# Patient Record
Sex: FEMALE | Race: WHITE | NOT HISPANIC OR LATINO | URBAN - METROPOLITAN AREA
[De-identification: names, ages, dates, MRNs, and addresses within clinical notes are randomized per-mention and may not be internally consistent; named-entity substitution may affect disease eponyms.]

---

## 2017-06-26 ENCOUNTER — INPATIENT (INPATIENT)
Facility: HOSPITAL | Age: 57
LOS: 1 days | Discharge: HOME | End: 2017-06-28
Attending: INTERNAL MEDICINE | Admitting: INTERNAL MEDICINE
Payer: COMMERCIAL

## 2017-06-26 VITALS — HEIGHT: 63 IN | WEIGHT: 179.9 LBS

## 2017-06-26 DIAGNOSIS — L03.012 CELLULITIS OF LEFT FINGER: ICD-10-CM

## 2017-06-26 DIAGNOSIS — I89.1 LYMPHANGITIS: ICD-10-CM

## 2017-06-26 LAB
ALBUMIN SERPL ELPH-MCNC: 3.6 G/DL — SIGNIFICANT CHANGE UP (ref 3.3–5)
ALP SERPL-CCNC: 121 U/L — HIGH (ref 40–120)
ALT FLD-CCNC: 30 U/L — SIGNIFICANT CHANGE UP (ref 12–78)
ANION GAP SERPL CALC-SCNC: 9 MMOL/L — SIGNIFICANT CHANGE UP (ref 5–17)
AST SERPL-CCNC: 28 U/L — SIGNIFICANT CHANGE UP (ref 15–37)
BASOPHILS # BLD AUTO: 0.1 K/UL — SIGNIFICANT CHANGE UP (ref 0–0.2)
BASOPHILS NFR BLD AUTO: 0.9 % — SIGNIFICANT CHANGE UP (ref 0–2)
BILIRUB SERPL-MCNC: 0.2 MG/DL — SIGNIFICANT CHANGE UP (ref 0.2–1.2)
BUN SERPL-MCNC: 20 MG/DL — SIGNIFICANT CHANGE UP (ref 7–23)
CALCIUM SERPL-MCNC: 8.9 MG/DL — SIGNIFICANT CHANGE UP (ref 8.5–10.1)
CHLORIDE SERPL-SCNC: 108 MMOL/L — SIGNIFICANT CHANGE UP (ref 96–108)
CO2 SERPL-SCNC: 25 MMOL/L — SIGNIFICANT CHANGE UP (ref 22–31)
CREAT SERPL-MCNC: 0.83 MG/DL — SIGNIFICANT CHANGE UP (ref 0.5–1.3)
EOSINOPHIL # BLD AUTO: 0.3 K/UL — SIGNIFICANT CHANGE UP (ref 0–0.5)
EOSINOPHIL NFR BLD AUTO: 3.7 % — SIGNIFICANT CHANGE UP (ref 0–6)
GLUCOSE SERPL-MCNC: 114 MG/DL — HIGH (ref 70–99)
HCT VFR BLD CALC: 40.4 % — SIGNIFICANT CHANGE UP (ref 34.5–45)
HGB BLD-MCNC: 13.5 G/DL — SIGNIFICANT CHANGE UP (ref 11.5–15.5)
LYMPHOCYTES # BLD AUTO: 2.1 K/UL — SIGNIFICANT CHANGE UP (ref 1–3.3)
LYMPHOCYTES # BLD AUTO: 22.6 % — SIGNIFICANT CHANGE UP (ref 13–44)
MCHC RBC-ENTMCNC: 27.5 PG — SIGNIFICANT CHANGE UP (ref 27–34)
MCHC RBC-ENTMCNC: 33.5 GM/DL — SIGNIFICANT CHANGE UP (ref 32–36)
MCV RBC AUTO: 82.1 FL — SIGNIFICANT CHANGE UP (ref 80–100)
MONOCYTES # BLD AUTO: 0.6 K/UL — SIGNIFICANT CHANGE UP (ref 0–0.9)
MONOCYTES NFR BLD AUTO: 6.4 % — SIGNIFICANT CHANGE UP (ref 2–14)
NEUTROPHILS # BLD AUTO: 6.2 K/UL — SIGNIFICANT CHANGE UP (ref 1.8–7.4)
NEUTROPHILS NFR BLD AUTO: 66.4 % — SIGNIFICANT CHANGE UP (ref 43–77)
PLATELET # BLD AUTO: 266 K/UL — SIGNIFICANT CHANGE UP (ref 150–400)
POTASSIUM SERPL-MCNC: 4.3 MMOL/L — SIGNIFICANT CHANGE UP (ref 3.5–5.3)
POTASSIUM SERPL-SCNC: 4.3 MMOL/L — SIGNIFICANT CHANGE UP (ref 3.5–5.3)
PROT SERPL-MCNC: 7.4 GM/DL — SIGNIFICANT CHANGE UP (ref 6–8.3)
RBC # BLD: 4.92 M/UL — SIGNIFICANT CHANGE UP (ref 3.8–5.2)
RBC # FLD: 13.1 % — SIGNIFICANT CHANGE UP (ref 10.3–14.5)
SODIUM SERPL-SCNC: 142 MMOL/L — SIGNIFICANT CHANGE UP (ref 135–145)
WBC # BLD: 9.4 K/UL — SIGNIFICANT CHANGE UP (ref 3.8–10.5)
WBC # FLD AUTO: 9.4 K/UL — SIGNIFICANT CHANGE UP (ref 3.8–10.5)

## 2017-06-26 PROCEDURE — 99285 EMERGENCY DEPT VISIT HI MDM: CPT

## 2017-06-26 PROCEDURE — 71020: CPT | Mod: 26

## 2017-06-26 PROCEDURE — 93010 ELECTROCARDIOGRAM REPORT: CPT

## 2017-06-26 PROCEDURE — 73140 X-RAY EXAM OF FINGER(S): CPT | Mod: 26,LT

## 2017-06-26 RX ORDER — ACETAMINOPHEN 500 MG
1000 TABLET ORAL ONCE
Qty: 0 | Refills: 0 | Status: DISCONTINUED | OUTPATIENT
Start: 2017-06-26 | End: 2017-06-28

## 2017-06-26 RX ORDER — AMPICILLIN SODIUM AND SULBACTAM SODIUM 250; 125 MG/ML; MG/ML
3 INJECTION, POWDER, FOR SUSPENSION INTRAMUSCULAR; INTRAVENOUS ONCE
Qty: 0 | Refills: 0 | Status: COMPLETED | OUTPATIENT
Start: 2017-06-26 | End: 2017-06-26

## 2017-06-26 RX ORDER — DOCUSATE SODIUM 100 MG
100 CAPSULE ORAL THREE TIMES A DAY
Qty: 0 | Refills: 0 | Status: DISCONTINUED | OUTPATIENT
Start: 2017-06-26 | End: 2017-06-28

## 2017-06-26 RX ORDER — VANCOMYCIN HCL 1 G
1000 VIAL (EA) INTRAVENOUS ONCE
Qty: 0 | Refills: 0 | Status: COMPLETED | OUTPATIENT
Start: 2017-06-26 | End: 2017-06-26

## 2017-06-26 RX ORDER — VANCOMYCIN HCL 1 G
1000 VIAL (EA) INTRAVENOUS EVERY 12 HOURS
Qty: 0 | Refills: 0 | Status: DISCONTINUED | OUTPATIENT
Start: 2017-06-26 | End: 2017-06-28

## 2017-06-26 RX ORDER — AMPICILLIN SODIUM AND SULBACTAM SODIUM 250; 125 MG/ML; MG/ML
3 INJECTION, POWDER, FOR SUSPENSION INTRAMUSCULAR; INTRAVENOUS EVERY 6 HOURS
Qty: 0 | Refills: 0 | Status: DISCONTINUED | OUTPATIENT
Start: 2017-06-26 | End: 2017-06-28

## 2017-06-26 RX ORDER — ACETAMINOPHEN 500 MG
650 TABLET ORAL EVERY 6 HOURS
Qty: 0 | Refills: 0 | Status: DISCONTINUED | OUTPATIENT
Start: 2017-06-26 | End: 2017-06-28

## 2017-06-26 RX ORDER — KETOROLAC TROMETHAMINE 30 MG/ML
30 SYRINGE (ML) INJECTION EVERY 6 HOURS
Qty: 0 | Refills: 0 | Status: DISCONTINUED | OUTPATIENT
Start: 2017-06-26 | End: 2017-06-28

## 2017-06-26 RX ORDER — SENNA PLUS 8.6 MG/1
2 TABLET ORAL AT BEDTIME
Qty: 0 | Refills: 0 | Status: DISCONTINUED | OUTPATIENT
Start: 2017-06-26 | End: 2017-06-28

## 2017-06-26 RX ORDER — SODIUM CHLORIDE 9 MG/ML
1000 INJECTION INTRAMUSCULAR; INTRAVENOUS; SUBCUTANEOUS ONCE
Qty: 0 | Refills: 0 | Status: COMPLETED | OUTPATIENT
Start: 2017-06-26 | End: 2017-06-26

## 2017-06-26 RX ORDER — MUPIROCIN 20 MG/G
1 OINTMENT TOPICAL
Qty: 0 | Refills: 0 | Status: DISCONTINUED | OUTPATIENT
Start: 2017-06-26 | End: 2017-06-28

## 2017-06-26 RX ADMIN — AMPICILLIN SODIUM AND SULBACTAM SODIUM 200 GRAM(S): 250; 125 INJECTION, POWDER, FOR SUSPENSION INTRAMUSCULAR; INTRAVENOUS at 20:07

## 2017-06-26 RX ADMIN — Medication 1 TABLET(S): at 17:40

## 2017-06-26 RX ADMIN — Medication 650 MILLIGRAM(S): at 17:35

## 2017-06-26 RX ADMIN — Medication 30 MILLIGRAM(S): at 17:48

## 2017-06-26 RX ADMIN — SODIUM CHLORIDE 1000 MILLILITER(S): 9 INJECTION INTRAMUSCULAR; INTRAVENOUS; SUBCUTANEOUS at 11:41

## 2017-06-26 RX ADMIN — Medication 30 MILLIGRAM(S): at 18:02

## 2017-06-26 RX ADMIN — Medication 250 MILLIGRAM(S): at 15:27

## 2017-06-26 RX ADMIN — Medication 650 MILLIGRAM(S): at 16:57

## 2017-06-26 RX ADMIN — AMPICILLIN SODIUM AND SULBACTAM SODIUM 200 GRAM(S): 250; 125 INJECTION, POWDER, FOR SUSPENSION INTRAMUSCULAR; INTRAVENOUS at 11:41

## 2017-06-26 NOTE — H&P ADULT - HISTORY OF PRESENT ILLNESS
66 year old female with history of breast cancer s/p bilateral mastectomy 3 yr ago followed by multiple surgeries for reconstruction and breast implants presents with c/o left index finger pain, swelling, warmth and redness spreading from left index finger to left elbow for 5 days graudally worsening.  Pt noticed left index finger medial swelling and pain first after noticed a small abrasion which looked like a 'paper cut'.  She had manicure 4 days before lesion started.  She went to [place] after [place].  She denies fever, chills, malaise, night sweats, loss of function of left arm/hand, headache.

## 2017-06-26 NOTE — H&P ADULT - ASSESSMENT
66 year old female with history of breast cancer s/p bilateral mastectomy 3 yr ago followed by multiple surgeries for reconstruction and breast implants presents with c/o left index finger pain, swelling, warmth and redness spreading from left index finger to left elbow for 5 days graudally worsening.  Pt noticed left index finger medial swelling and pain first after noticed a small abrasion which looked like a 'paper cut'.

## 2017-06-26 NOTE — PROGRESS NOTE ADULT - SUBJECTIVE AND OBJECTIVE BOX
See dictated consult.      56 F with 3 d hx worsening L index finger erythema, edema, ternderness, ascending lymphangiitis.    Bedside I&D performed.  Small amount of pus drained.      A/P: Begin BID warm soaks with saline + betadine tomorrow  Remove packing at first dressing change  Cover with bactroban and dry dressing  F/u as outpt  D/c as per primary team

## 2017-06-26 NOTE — ED STATDOCS - MUSCULOSKELETAL, MLM
Left hand with erythema and swelling to radial side of index finger. Erythema on dorsal side of hand ending approximately 2cm proximal to wrist.

## 2017-06-26 NOTE — ED STATDOCS - ATTENDING CONTRIBUTION TO CARE
I, Chris Roper, performed the initial face to face bedside interview with this patient regarding history of present illness, review of symptoms and relevant past medical, social and family history.  I completed an independent physical examination.  I was the initial provider who evaluated this patient. I have signed out the follow up of any pending tests (i.e. labs, radiological studies) to the ACP.  I have communicated the patient’s plan of care and disposition with the ACP.  The history, relevant review of systems, past medical and surgical history, medical decision making, and physical examination was documented by the scribe in my presence and I attest to the accuracy of the documentation.

## 2017-06-26 NOTE — ED STATDOCS - OBJECTIVE STATEMENT
55 y/o female with PMHx of breast CA, s/p mastectomy presents to the ED c/o swelling and pain to 2nd digit on left hand with redness spreading from finger to wrist over the last few days. Denies any tingling, numbness. Pt had paper cut to the finger recently. Pt does not bite her nails. Recent travel to [place] and came back 6 days ago.

## 2017-06-26 NOTE — H&P ADULT - NSHPPHYSICALEXAM_GEN_ALL_CORE
PHYSICAL EXAM:      Constitutional: NAD, well-groomed, well-developed  HEENT: PERRLA, EOMI, Normal Hearing  Neck: No LAD, No JVD  Back: Normal spine flexure, No CVA tenderness  Respiratory: CTABL  Cardiovascular: S1 and S2, RRR, no M/G/R  Gastrointestinal: BS+, soft, NT/ND  Extremities: No peripheral edema  Vascular: 2+ peripheral pulses  Neurological: A/O x 3, no focal deficits  Psychiatric: Normal mood, normal affect  Musculoskeletal: 5/5 strength b/l upper and lower extremities  Skin: left index finger erythema, swelling, tenderness, streaking upto left elbow vental aspect

## 2017-06-26 NOTE — ED ADULT TRIAGE NOTE - CHIEF COMPLAINT QUOTE
left hand first digit infection x 5 days. pt had small cut at tip of finger and now present with red tracking marking going up forearm, pt denies fevers.

## 2017-06-26 NOTE — ED STATDOCS - PROGRESS NOTE DETAILS
signed Esther Duffy PA-C Pt seen initially in intake by Dr Roper.   pt here for left index finger infection, ascending lymphangitis just proximal to AC fossa. no adenothpathy. denies fever or chills. no hx trauma, pt notes she saw a "papercut" then traveled to [place] and [place]. Right hand dominant. SPoke to Dr Barnes for hand. recommends admit to medicine. Pt agrees with admission and plan of care.

## 2017-06-26 NOTE — ED ADULT NURSE NOTE - OBJECTIVE STATEMENT
Pt presents with swelling and reddness to L finger and hand. Pt thought it was a paper cut but got worse within the last week. Red streak noted to L arm. NS infusing per orders and antibiotic infusing per orders. PA at bedside.

## 2017-06-26 NOTE — PROVIDER CONTACT NOTE (OTHER) - SITUATION
Dr. Barnes's office called. Spoke to  Zaida, who said that Dr. Barnes was on call and she would give him the message to see the patient

## 2017-06-26 NOTE — H&P ADULT - PROBLEM SELECTOR PLAN 1
..  - Admit to floor  - IV vanco and unasyn  - Hand surgery consult   - Toradol and tylenol for analgesia  - Encourage ambulation and keep affected limb elevated ..  - Admit to floor  - IV vanco and unasyn  - Hand surgery consult   - Toradol and tylenol for analgesia  - Encourage ambulation and keep affected limb elevated  - PO diet

## 2017-06-27 LAB
ANION GAP SERPL CALC-SCNC: 8 MMOL/L — SIGNIFICANT CHANGE UP (ref 5–17)
BUN SERPL-MCNC: 21 MG/DL — SIGNIFICANT CHANGE UP (ref 7–23)
CALCIUM SERPL-MCNC: 8.4 MG/DL — LOW (ref 8.5–10.1)
CHLORIDE SERPL-SCNC: 109 MMOL/L — HIGH (ref 96–108)
CO2 SERPL-SCNC: 26 MMOL/L — SIGNIFICANT CHANGE UP (ref 22–31)
CREAT SERPL-MCNC: 0.66 MG/DL — SIGNIFICANT CHANGE UP (ref 0.5–1.3)
GLUCOSE SERPL-MCNC: 90 MG/DL — SIGNIFICANT CHANGE UP (ref 70–99)
HCT VFR BLD CALC: 38.2 % — SIGNIFICANT CHANGE UP (ref 34.5–45)
HGB BLD-MCNC: 12.5 G/DL — SIGNIFICANT CHANGE UP (ref 11.5–15.5)
MCHC RBC-ENTMCNC: 27.2 PG — SIGNIFICANT CHANGE UP (ref 27–34)
MCHC RBC-ENTMCNC: 32.7 GM/DL — SIGNIFICANT CHANGE UP (ref 32–36)
MCV RBC AUTO: 83.3 FL — SIGNIFICANT CHANGE UP (ref 80–100)
PLATELET # BLD AUTO: 231 K/UL — SIGNIFICANT CHANGE UP (ref 150–400)
POTASSIUM SERPL-MCNC: 4.2 MMOL/L — SIGNIFICANT CHANGE UP (ref 3.5–5.3)
POTASSIUM SERPL-SCNC: 4.2 MMOL/L — SIGNIFICANT CHANGE UP (ref 3.5–5.3)
RBC # BLD: 4.59 M/UL — SIGNIFICANT CHANGE UP (ref 3.8–5.2)
RBC # FLD: 12.9 % — SIGNIFICANT CHANGE UP (ref 10.3–14.5)
SODIUM SERPL-SCNC: 143 MMOL/L — SIGNIFICANT CHANGE UP (ref 135–145)
WBC # BLD: 7.1 K/UL — SIGNIFICANT CHANGE UP (ref 3.8–10.5)
WBC # FLD AUTO: 7.1 K/UL — SIGNIFICANT CHANGE UP (ref 3.8–10.5)

## 2017-06-27 RX ADMIN — Medication 1 TABLET(S): at 11:24

## 2017-06-27 RX ADMIN — Medication 1 MILLIGRAM(S): at 00:17

## 2017-06-27 RX ADMIN — AMPICILLIN SODIUM AND SULBACTAM SODIUM 200 GRAM(S): 250; 125 INJECTION, POWDER, FOR SUSPENSION INTRAMUSCULAR; INTRAVENOUS at 02:30

## 2017-06-27 RX ADMIN — Medication 30 MILLIGRAM(S): at 05:56

## 2017-06-27 RX ADMIN — Medication 250 MILLIGRAM(S): at 18:22

## 2017-06-27 RX ADMIN — MUPIROCIN 1 APPLICATION(S): 20 OINTMENT TOPICAL at 21:32

## 2017-06-27 RX ADMIN — AMPICILLIN SODIUM AND SULBACTAM SODIUM 200 GRAM(S): 250; 125 INJECTION, POWDER, FOR SUSPENSION INTRAMUSCULAR; INTRAVENOUS at 07:56

## 2017-06-27 RX ADMIN — Medication 100 MILLIGRAM(S): at 05:49

## 2017-06-27 RX ADMIN — Medication 250 MILLIGRAM(S): at 05:49

## 2017-06-27 RX ADMIN — AMPICILLIN SODIUM AND SULBACTAM SODIUM 200 GRAM(S): 250; 125 INJECTION, POWDER, FOR SUSPENSION INTRAMUSCULAR; INTRAVENOUS at 11:23

## 2017-06-27 RX ADMIN — Medication 30 MILLIGRAM(S): at 06:10

## 2017-06-27 RX ADMIN — MUPIROCIN 1 APPLICATION(S): 20 OINTMENT TOPICAL at 11:24

## 2017-06-27 RX ADMIN — AMPICILLIN SODIUM AND SULBACTAM SODIUM 200 GRAM(S): 250; 125 INJECTION, POWDER, FOR SUSPENSION INTRAMUSCULAR; INTRAVENOUS at 23:08

## 2017-06-27 RX ADMIN — AMPICILLIN SODIUM AND SULBACTAM SODIUM 200 GRAM(S): 250; 125 INJECTION, POWDER, FOR SUSPENSION INTRAMUSCULAR; INTRAVENOUS at 17:51

## 2017-06-27 RX ADMIN — Medication 100 MILLIGRAM(S): at 21:27

## 2017-06-27 NOTE — PROGRESS NOTE ADULT - SUBJECTIVE AND OBJECTIVE BOX
CHIEF COMPLAINT:    SUBJECTIVE:     REVIEW OF SYSTEMS:    CONSTITUTIONAL: No weakness, fevers or chills  EYES/ENT: No visual changes;  No vertigo or throat pain   NECK: No pain or stiffness  RESPIRATORY: No cough, wheezing, hemoptysis; No shortness of breath  CARDIOVASCULAR: No chest pain or palpitations  GASTROINTESTINAL: No abdominal or epigastric pain. No nausea, vomiting, or hematemesis; No diarrhea or constipation. No melena or hematochezia.  GENITOURINARY: No dysuria, frequency or hematuria  NEUROLOGICAL: No numbness or weakness  SKIN: No itching, burning, rashes, or lesions   All other review of systems is negative unless indicated above    Vital Signs Last 24 Hrs  T(C): 36.3 (27 Jun 2017 04:45), Max: 36.6 (26 Jun 2017 10:43)  T(F): 97.3 (27 Jun 2017 04:45), Max: 97.9 (26 Jun 2017 10:43)  HR: 92 (26 Jun 2017 15:11) (71 - 92)  BP: 134/79 (26 Jun 2017 15:11) (132/73 - 134/79)  BP(mean): --  RR: 16 (27 Jun 2017 04:45) (15 - 18)  SpO2: 98% (27 Jun 2017 04:45) (97% - 100%)    I&O's Summary    26 Jun 2017 07:01  -  27 Jun 2017 07:00  --------------------------------------------------------  IN: 550 mL / OUT: 0 mL / NET: 550 mL        CAPILLARY BLOOD GLUCOSE          PHYSICAL EXAM:    Constitutional: NAD, awake and alert, well-developed  HEENT: PERR, EOMI, Normal Hearing, MMM  Neck: Soft and supple, No LAD, No JVD  Respiratory: Breath sounds are clear bilaterally, No wheezing, rales or rhonchi  Cardiovascular: S1 and S2, regular rate and rhythm, no Murmurs, gallops or rubs  Gastrointestinal: Bowel Sounds present, soft, nontender, nondistended, no guarding, no rebound  Extremities: No peripheral edema  Vascular: 2+ peripheral pulses  Neurological: A/O x 3, no focal deficits  Musculoskeletal: 5/5 strength b/l upper and lower extremities  Skin: No rashes    MEDICATIONS:  MEDICATIONS  (STANDING):  acetaminophen  IVPB. 1000 milliGRAM(s) IV Intermittent once  docusate sodium 100 milliGRAM(s) Oral three times a day  multivitamin 1 Tablet(s) Oral daily  vancomycin  IVPB 1000 milliGRAM(s) IV Intermittent every 12 hours  ampicillin/sulbactam  IVPB 3 Gram(s) IV Intermittent every 6 hours  mupirocin 2% Ointment 1 Application(s) Topical two times a day      LABS: All Labs Reviewed:                        12.5   7.1   )-----------( 231      ( 27 Jun 2017 07:35 )             38.2     06-27    143  |  109<H>  |  21  ----------------------------<  90  4.2   |  26  |  0.66    Ca    8.4<L>      27 Jun 2017 07:35    TPro  7.4  /  Alb  3.6  /  TBili  0.2  /  DBili  x   /  AST  28  /  ALT  30  /  AlkPhos  121<H>  06-26            66 year old female with history of breast cancer s/p bilateral mastectomy 3 yr ago followed by multiple surgeries for reconstruction and breast implants presents with c/o left index finger pain, swelling, warmth and redness spreading from left index finger to left elbow for 5 days graudally worsening.  Pt noticed left index finger medial swelling and pain first after noticed a small abrasion which looked like a 'paper cut'.    Problem/Plan - 1:  ·  Problem: Cellulitis of finger, left.  Plan: ..  - Admit to floor  - IV vanco and unasyn  - Hand surgery consult   - Toradol and tylenol for analgesia  - Encourage ambulation and keep affected limb elevated  - PO diet.     Problem/Plan - 2:  ·  Problem: Ascending lymphangitis.  Plan: ..   - As above. CHIEF COMPLAINT/Diagnosis: cellulitis of the finger    SUBJECTIVE: no complaints    REVIEW OF SYSTEMS:    CONSTITUTIONAL: No weakness, fevers or chills  EYES/ENT: No visual changes;  No vertigo or throat pain   NECK: No pain or stiffness  RESPIRATORY: No cough, wheezing, hemoptysis; No shortness of breath  CARDIOVASCULAR: No chest pain or palpitations  GASTROINTESTINAL: No abdominal or epigastric pain. No nausea, vomiting, or hematemesis; No diarrhea or constipation. No melena or hematochezia.  GENITOURINARY: No dysuria, frequency or hematuria  NEUROLOGICAL: No numbness or weakness  SKIN: No itching, burning, rashes, or lesions   All other review of systems is negative unless indicated above    Vital Signs Last 24 Hrs  T(C): 36.3 (27 Jun 2017 04:45), Max: 36.6 (26 Jun 2017 10:43)  T(F): 97.3 (27 Jun 2017 04:45), Max: 97.9 (26 Jun 2017 10:43)  HR: 92 (26 Jun 2017 15:11) (71 - 92)  BP: 134/79 (26 Jun 2017 15:11) (132/73 - 134/79)  BP(mean): --  RR: 16 (27 Jun 2017 04:45) (15 - 18)  SpO2: 98% (27 Jun 2017 04:45) (97% - 100%)    I&O's Summary    26 Jun 2017 07:01  -  27 Jun 2017 07:00  --------------------------------------------------------  IN: 550 mL / OUT: 0 mL / NET: 550 mL        CAPILLARY BLOOD GLUCOSE          PHYSICAL EXAM:    Constitutional: NAD, awake and alert, well-developed  HEENT: PERR, EOMI, Normal Hearing, MMM  Neck: Soft and supple, No LAD, No JVD  Respiratory: Breath sounds are clear bilaterally, No wheezing, rales or rhonchi  Cardiovascular: S1 and S2, regular rate and rhythm, no Murmurs, gallops or rubs  Gastrointestinal: Bowel Sounds present, soft, nontender, nondistended, no guarding, no rebound  Extremities: No peripheral edema  Vascular: 2+ peripheral pulses  Neurological: A/O x 3, no focal deficits  Musculoskeletal: 5/5 strength b/l upper and lower extremities  Skin: erthema on the left index finger w/ extension in tracking distrubution to the upper arm    MEDICATIONS:  MEDICATIONS  (STANDING):  acetaminophen  IVPB. 1000 milliGRAM(s) IV Intermittent once  docusate sodium 100 milliGRAM(s) Oral three times a day  multivitamin 1 Tablet(s) Oral daily  vancomycin  IVPB 1000 milliGRAM(s) IV Intermittent every 12 hours  ampicillin/sulbactam  IVPB 3 Gram(s) IV Intermittent every 6 hours  mupirocin 2% Ointment 1 Application(s) Topical two times a day      LABS: All Labs Reviewed:                        12.5   7.1   )-----------( 231      ( 27 Jun 2017 07:35 )             38.2     06-27    143  |  109<H>  |  21  ----------------------------<  90  4.2   |  26  |  0.66    Ca    8.4<L>      27 Jun 2017 07:35    TPro  7.4  /  Alb  3.6  /  TBili  0.2  /  DBili  x   /  AST  28  /  ALT  30  /  AlkPhos  121<H>  06-26            66 year old female with history of breast cancer s/p total  bilateral mastectomy 3 yr ago followed by multiple surgeries for reconstruction and breast implants presents with c/o left index finger pain, swelling, warmth and redness spreading from left index finger to left elbow for 5 days graudally worsening.  Pt noticed left index finger medial swelling and pain first after noticed a small abrasion which looked like a 'paper cut'.    1-Cellulitis of finger, left and Lymphangitis  -s/p I+D and drainage of the lesion; wound looks clean, no drainage. Erythema persists in a lymph drainage distrubution from the hand to the inner upper arm  - IV vanco and unasyn- tolerating well  - Toradol and percocet for pain control  - Encourage ambulation and keep affected limb elevated  - PO diet.     2-DVT proph- patient is ambulatory

## 2017-06-27 NOTE — DIETITIAN INITIAL EVALUATION ADULT. - FACTORS AFF FOOD INTAKE
difficulty feeding self/Patient admitted with left finger cellulitis, has difficulty cutting foods as her finger is wrapped up

## 2017-06-27 NOTE — DIETITIAN INITIAL EVALUATION ADULT. - NS FNS REASON FOR WEIGHT CHANG
patient states she gained 30# after breast cancer, has lost 105# successfully after bariatric surgery

## 2017-06-27 NOTE — CONSULT NOTE ADULT - ASSESSMENT
66 year old female with history of breast cancer s/p bilateral mastectomy 3 yr ago followed by multiple surgeries for reconstruction and breast implants admitted 6/27 with c/p L index finger pain for about 5 days, has been experiencing L index finger pain, swelling, warmth, redness from L index finger tracking up to her L elbow. A few days ago, pt had a small paper cut which appeared as a small abrasion, she had a manicure about 4 days prior to the paper cut. She recently traveled to [place] and was swimming in [facility] [place] [facility] from 6/20-6/24 but lesions started after this. No fever, no chills, here afebrile, normal wbc ct, was given IV vancomycin/unasyn, had incision and drainage per surgery.     1. L index finger cellulitis/abscess s/p I &D with ascending lymphangitis    - agree with IV vancomycin [id number], check trough prior to 4th dose #2    - continue with unasyn [id number] for strep/staph/anaroebic coverage #2    - f/u cultures    - supportive care    - monitor temps    - -tolerating abx well so far; no side effects noted    -reason for abx use and side effects reviewed with patient; monitor BMP and vancomycin trough levels    - f/u cbc    - surgical f/u

## 2017-06-27 NOTE — CONSULT NOTE ADULT - SUBJECTIVE AND OBJECTIVE BOX
Patient is a 56y old  Female who presents with a chief complaint of Left index finger pain, erythema, warmth and redness spreading to elbow (26 Jun 2017 15:34)      HPI:  66 year old female with history of breast cancer s/p bilateral mastectomy 3 yr ago followed by multiple surgeries for reconstruction and breast implants admitted 6/27 with c/p L index finger pain for about 5 days, has been experiencing L index finger pain, swelling, warmth, redness from L index finger tracking up to her L elbow. A few days ago, pt had a small paper cut which appeared as a small abrasion, she had a manicure about 4 days prior to the paper cut. She recently traveled to [place] and was swimming in [facility] [place] [facility] from 6/20-6/24 but lesions started after this. No fever, no chills, here afebrile, normal wbc ct, was given IV vancomycin/unasyn, had incision and drainage per surgery.      PMH: as above    PSH: as above    Meds: per reconciliation sheet, noted below    MEDICATIONS  (STANDING):  acetaminophen  IVPB. 1000 milliGRAM(s) IV Intermittent once  docusate sodium 100 milliGRAM(s) Oral three times a day  multivitamin 1 Tablet(s) Oral daily  vancomycin  IVPB 1000 milliGRAM(s) IV Intermittent every 12 hours  ampicillin/sulbactam  IVPB 3 Gram(s) IV Intermittent every 6 hours  mupirocin 2% Ointment 1 Application(s) Topical two times a day      Allergies    No Known Allergies    Intolerances        Social: no smoking, no alcohol, no illegal drugs; no recent travel, no exposure to TB    Family history: NC      ROS: the patient denies fever, no chills, no HA, no dizziness, no sore throat, no blurry vision, no CP, no palpitations, no SOB, no cough, no abdominal pain, no diarrhea, no N/V, no dysuria, no leg pain, no claudication,  no rectal pain or bleeding, no night sweats    Vital Signs Last 24 Hrs  T(C): 36.5 (27 Jun 2017 12:03), Max: 36.5 (27 Jun 2017 12:03)  T(F): 97.7 (27 Jun 2017 12:03), Max: 97.7 (27 Jun 2017 12:03)  HR: 52 (27 Jun 2017 12:03) (52 - 92)  BP: 132/66 (27 Jun 2017 12:03) (132/66 - 134/79)  BP(mean): --  RR: 17 (27 Jun 2017 12:03) (15 - 17)  SpO2: 97% (27 Jun 2017 12:03) (97% - 98%)      PE:  Constitutional: NAD  HEENT: NC/AT, EOMI, PERRLA  Neck: supple  Back: no tenderness  Respiratory: clear  Cardiovascular: S1S2 regular, no murmurs  Abdomen: soft, not tender, not distended, positive BS  Genitourinary: deferred  Rectal: deferred  Musculoskeletal: no muscle tenderness, no joint swelling or tenderness  Extremities: L index finger s/p I & D with erythema, edema, tracking erythema up arm c/w lymphangitic spread  Neurological: AxOx3, moving all extremities, no focal deficits  Skin: no rashes    Labs:                        12.5   7.1   )-----------( 231      ( 27 Jun 2017 07:35 )             38.2     06-27    143  |  109<H>  |  21  ----------------------------<  90  4.2   |  26  |  0.66    Ca    8.4<L>      27 Jun 2017 07:35    TPro  7.4  /  Alb  3.6  /  TBili  0.2  /  DBili  x   /  AST  28  /  ALT  30  /  AlkPhos  121<H>  06-26     LIVER FUNCTIONS - ( 26 Jun 2017 11:40 )  Alb: 3.6 g/dL / Pro: 7.4 gm/dL / ALK PHOS: 121 U/L / ALT: 30 U/L / AST: 28 U/L / GGT: x                   Radiology: < from: Xray Chest 2 Views PA/Lat (06.26.17 @ 12:52) >    EXAM:  CHEST P.A. LATERAL                            PROCEDURE DATE:  06/26/2017        INTERPRETATION:  Exam Date: 6/26/2017 12:52 PM    Chest radiographs        CLINICAL INFORMATION:  ascending lymphangitis    COMPARISON: January 17, 2011    TECHNIQUE:  Frontal and lateral views of the chest were obtained.    FINDINGS/  IMPRESSION:      Surgical clips within the bilateral anterior chest wall. Surgical clips   in the left breast region. The lungs are clear.  No pleural abnormality   is seen.      The heart and mediastinum appear intact.         < end of copied text >      Advanced directives addressed: full resuscitation

## 2017-06-27 NOTE — DIETITIAN INITIAL EVALUATION ADULT. - OTHER INFO
66 year old female with history of breast cancer s/p bilateral mastectomy 3 yr ago followed by multiple surgeries for reconstruction and breast implants presents with c/o left index finger pain, swelling, warmth and redness spreading from left index finger to left elbow for 5 days gradually worsening. Patient reports [place] sx in 2003, with successful 105# weight loss- Patient states 3 years ago she was dx with breast cancer and gained 30#. Patient has been actively trying to lose weight- follows an Atkins diet with high protein. 66 year old female with history of breast cancer s/p bilateral mastectomy 3 yr ago followed by multiple surgeries for reconstruction and breast implants presents with c/o left index finger pain, swelling, warmth and redness spreading from left index finger to left elbow for 5 days gradually worsening.

## 2017-06-28 VITALS
RESPIRATION RATE: 14 BRPM | HEART RATE: 64 BPM | TEMPERATURE: 97 F | OXYGEN SATURATION: 99 % | DIASTOLIC BLOOD PRESSURE: 83 MMHG | SYSTOLIC BLOOD PRESSURE: 156 MMHG

## 2017-06-28 LAB — VANCOMYCIN TROUGH SERPL-MCNC: 10.7 UG/ML — SIGNIFICANT CHANGE UP (ref 10–20)

## 2017-06-28 RX ADMIN — Medication 250 MILLIGRAM(S): at 05:23

## 2017-06-28 RX ADMIN — MUPIROCIN 1 APPLICATION(S): 20 OINTMENT TOPICAL at 12:05

## 2017-06-28 RX ADMIN — AMPICILLIN SODIUM AND SULBACTAM SODIUM 200 GRAM(S): 250; 125 INJECTION, POWDER, FOR SUSPENSION INTRAMUSCULAR; INTRAVENOUS at 05:23

## 2017-06-28 RX ADMIN — Medication 100 MILLIGRAM(S): at 05:22

## 2017-06-28 RX ADMIN — Medication 30 MILLIGRAM(S): at 00:45

## 2017-06-28 RX ADMIN — Medication 30 MILLIGRAM(S): at 01:00

## 2017-06-28 RX ADMIN — AMPICILLIN SODIUM AND SULBACTAM SODIUM 200 GRAM(S): 250; 125 INJECTION, POWDER, FOR SUSPENSION INTRAMUSCULAR; INTRAVENOUS at 12:04

## 2017-06-28 RX ADMIN — Medication 1 TABLET(S): at 12:04

## 2017-06-28 NOTE — PROGRESS NOTE ADULT - SUBJECTIVE AND OBJECTIVE BOX
CHIEF COMPLAINT/Diagnosis: finger cellulitis    SUBJECTIVE:     REVIEW OF SYSTEMS:    CONSTITUTIONAL: No weakness, fevers or chills  EYES/ENT: No visual changes;  No vertigo or throat pain   NECK: No pain or stiffness  RESPIRATORY: No cough, wheezing, hemoptysis; No shortness of breath  CARDIOVASCULAR: No chest pain or palpitations  GASTROINTESTINAL: No abdominal or epigastric pain. No nausea, vomiting, or hematemesis; No diarrhea or constipation. No melena or hematochezia.  GENITOURINARY: No dysuria, frequency or hematuria  NEUROLOGICAL: No numbness or weakness  SKIN: No itching, burning, rashes, or lesions   All other review of systems is negative unless indicated above    Vital Signs Last 24 Hrs  T(C): 36.4 (28 Jun 2017 05:08), Max: 36.5 (27 Jun 2017 12:03)  T(F): 97.5 (28 Jun 2017 05:08), Max: 97.7 (27 Jun 2017 12:03)  HR: 59 (28 Jun 2017 05:08) (52 - 59)  BP: 141/67 (28 Jun 2017 05:08) (132/66 - 141/67)  BP(mean): --  RR: 16 (28 Jun 2017 05:08) (16 - 17)  SpO2: 98% (28 Jun 2017 05:08) (97% - 98%)    I&O's Summary    27 Jun 2017 07:01  -  28 Jun 2017 07:00  --------------------------------------------------------  IN: 450 mL / OUT: 0 mL / NET: 450 mL        CAPILLARY BLOOD GLUCOSE          PHYSICAL EXAM:    Constitutional: NAD, awake and alert, well-developed  HEENT: PERR, EOMI, Normal Hearing, MMM  Neck: Soft and supple, No LAD, No JVD  Respiratory: Breath sounds are clear bilaterally, No wheezing, rales or rhonchi  Cardiovascular: S1 and S2, regular rate and rhythm, no Murmurs, gallops or rubs  Gastrointestinal: Bowel Sounds present, soft, nontender, nondistended, no guarding, no rebound  Extremities: No peripheral edema  Vascular: 2+ peripheral pulses  Neurological: A/O x 3, no focal deficits  Musculoskeletal: 5/5 strength b/l upper and lower extremities  Skin: No rashes    MEDICATIONS:  MEDICATIONS  (STANDING):  acetaminophen  IVPB. 1000 milliGRAM(s) IV Intermittent once  docusate sodium 100 milliGRAM(s) Oral three times a day  multivitamin 1 Tablet(s) Oral daily  vancomycin  IVPB 1000 milliGRAM(s) IV Intermittent every 12 hours  ampicillin/sulbactam  IVPB 3 Gram(s) IV Intermittent every 6 hours  mupirocin 2% Ointment 1 Application(s) Topical two times a day      LABS: All Labs Reviewed:                        12.5   7.1   )-----------( 231      ( 27 Jun 2017 07:35 )             38.2     06-27    143  |  109<H>  |  21  ----------------------------<  90  4.2   |  26  |  0.66    Ca    8.4<L>      27 Jun 2017 07:35    TPro  7.4  /  Alb  3.6  /  TBili  0.2  /  DBili  x   /  AST  28  /  ALT  30  /  AlkPhos  121<H>  06-26          Assessment and plan:    66 year old female with history of breast cancer s/p total  bilateral mastectomy 3 yr ago followed by multiple surgeries for reconstruction and breast implants presents with c/o left index finger pain, swelling, warmth and redness spreading from left index finger to left elbow for 5 days graudally worsening.  Pt noticed left index finger medial swelling and pain first after noticed a small abrasion which looked like a 'paper cut'.    1-Cellulitis of finger, left and Lymphangitis  -s/p I+D and drainage of the lesion; wound looks clean, no drainage. Erythema persists in a lymph drainage distrubution from the hand to the inner upper arm  - IV vanco and unasyn- tolerating well  - Toradol and percocet for pain control  - Encourage ambulation and keep affected limb elevated  - PO diet.     2-DVT proph- patient is ambulatory

## 2017-06-28 NOTE — DISCHARGE NOTE ADULT - CARE PLAN
Principal Discharge DX:	Cellulitis of finger, left  Goal:	c/w Augment and Doxycycline for 7 days more then stop  Instructions for follow-up, activity and diet:	c/w Augment and Doxycycline for 7 days more then stop

## 2017-06-28 NOTE — DISCHARGE NOTE ADULT - MEDICATION SUMMARY - MEDICATIONS TO TAKE
I will START or STAY ON the medications listed below when I get home from the hospital:    Adoxa 100 mg oral tablet  -- 1 tab(s) by mouth 2 times a day  -- Avoid prolonged or excessive exposure to direct and/or artificial sunlight while taking this medication.  Do not take this drug if you are pregnant.  Finish all this medication unless otherwise directed by prescriber.  Medication should be taken with plenty of water.    -- Indication: For Cellulitis    amoxicillin-clavulanate 875 mg-125 mg oral tablet  -- 1 tab(s) by mouth 2 times a day  -- Finish all this medication unless otherwise directed by prescriber.  Take with food or milk.    -- Indication: For Cellulitis    multivitamin  -- 1 tab(s) by mouth once a day  -- Indication: For prophylaxis

## 2017-06-28 NOTE — PROGRESS NOTE ADULT - ASSESSMENT
66 year old female with history of breast cancer s/p bilateral mastectomy 3 yr ago followed by multiple surgeries for reconstruction and breast implants admitted 6/27 with c/p L index finger pain for about 5 days, has been experiencing L index finger pain, swelling, warmth, redness from L index finger tracking up to her L elbow. A few days ago, pt had a small paper cut which appeared as a small abrasion, she had a manicure about 4 days prior to the paper cut. She recently traveled to [place] and was swimming in [facility] from 6/20-6/24 but lesions started after this. No fever, no chills, here afebrile, normal wbc ct, was given IV vancomycin/unasyn, had incision and drainage per surgery.     1. L index finger cellulitis/abscess s/p I &D with ascending lymphangitis    - improving    -  on IV vancomycin [id number], trough therapeutic, #3    - on IV unasyn 3gmq6h for strep/staph/anaroebic coverage #3    - switch to oral doxycycline 100mg BID and augmentin 875/125mg BID for 7 days    - supportive care    - monitor temps    - -tolerating abx well so far; no side effects noted    -reason for abx use and side effects reviewed with patient; monitor BMP and vancomycin trough levels

## 2017-06-28 NOTE — PROGRESS NOTE ADULT - SUBJECTIVE AND OBJECTIVE BOX
66 year old female with history of breast cancer s/p bilateral mastectomy 3 yr ago followed by multiple surgeries for reconstruction and breast implants admitted 6/27 with c/p L index finger pain for about 5 days, has been experiencing L index finger pain, swelling, warmth, redness from L index finger tracking up to her L elbow. A few days ago, pt had a small paper cut which appeared as a small abrasion, she had a manicure about 4 days prior to the paper cut. She recently traveled to [place] and was swimming in [facility] from 6/20-6/24 but lesions started after this. No fever, no chills, here afebrile, normal wbc ct, was given IV vancomycin/unasyn, had incision and drainage per surgery.        finger with less redness, swelling  resolved erythema/tracking up arm        MEDICATIONS  (STANDING):  acetaminophen  IVPB. 1000 milliGRAM(s) IV Intermittent once  docusate sodium 100 milliGRAM(s) Oral three times a day  multivitamin 1 Tablet(s) Oral daily  vancomycin  IVPB 1000 milliGRAM(s) IV Intermittent every 12 hours  ampicillin/sulbactam  IVPB 3 Gram(s) IV Intermittent every 6 hours  mupirocin 2% Ointment 1 Application(s) Topical two times a day      Vital Signs Last 24 Hrs  T(C): 36.2 (28 Jun 2017 10:38), Max: 36.5 (27 Jun 2017 12:03)  T(F): 97.2 (28 Jun 2017 10:38), Max: 97.7 (27 Jun 2017 12:03)  HR: 64 (28 Jun 2017 10:38) (52 - 64)  BP: 156/83 (28 Jun 2017 10:38) (132/66 - 156/83)  BP(mean): --  RR: 14 (28 Jun 2017 10:38) (14 - 17)  SpO2: 99% (28 Jun 2017 10:38) (97% - 99%)          PE:  Constitutional: NAD  HEENT: NC/AT, EOMI, PERRLA  Neck: supple  Back: no tenderness  Respiratory: clear  Cardiovascular: S1S2 regular, no murmurs  Abdomen: soft, not tender, not distended, positive BS  Genitourinary: deferred  Rectal: deferred  Musculoskeletal: no muscle tenderness, no joint swelling or tenderness  Extremities: L index finger s/p I & D with decreased erythema, edema  Neurological: AxOx3, moving all extremities, no focal deficits  Skin: see above    Labs:                        12.5   7.1   )-----------( 231      ( 27 Jun 2017 07:35 )             38.2     06-27    143  |  109<H>  |  21  ----------------------------<  90  4.2   |  26  |  0.66    Ca    8.4<L>      27 Jun 2017 07:35         Vancomycin Level, Trough: 10.7 ug/mL (06-28 @ 05:01)              Radiology: < from: Xray Chest 2 Views PA/Lat (06.26.17 @ 12:52) >    EXAM:  CHEST P.A. LATERAL                            PROCEDURE DATE:  06/26/2017        INTERPRETATION:  Exam Date: 6/26/2017 12:52 PM    Chest radiographs        CLINICAL INFORMATION:  ascending lymphangitis    COMPARISON: January 17, 2011    TECHNIQUE:  Frontal and lateral views of the chest were obtained.    FINDINGS/  IMPRESSION:      Surgical clips within the bilateral anterior chest wall. Surgical clips   in the left breast region. The lungs are clear.  No pleural abnormality   is seen.      The heart and mediastinum appear intact.         < end of copied text >      Advanced directives addressed: full resuscitation

## 2017-06-28 NOTE — DISCHARGE NOTE ADULT - PATIENT PORTAL LINK FT
“You can access the [facility] Patient Portal, offered by [facility], by registering with the following website: [email or web address]”

## 2017-07-02 DIAGNOSIS — Z85.3 PERSONAL HISTORY OF MALIGNANT NEOPLASM OF BREAST: ICD-10-CM

## 2017-07-02 DIAGNOSIS — L02.512 CUTANEOUS ABSCESS OF LEFT HAND: ICD-10-CM

## 2017-07-02 DIAGNOSIS — L03.012 CELLULITIS OF LEFT FINGER: ICD-10-CM

## 2017-08-02 ENCOUNTER — OUTPATIENT (OUTPATIENT)
Dept: OUTPATIENT SERVICES | Facility: HOSPITAL | Age: 57
LOS: 1 days | End: 2017-08-02
Payer: COMMERCIAL

## 2017-08-02 PROCEDURE — 85025 COMPLETE CBC W/AUTO DIFF WBC: CPT

## 2017-08-02 PROCEDURE — 93010 ELECTROCARDIOGRAM REPORT: CPT | Mod: NC

## 2017-08-02 PROCEDURE — 80048 BASIC METABOLIC PNL TOTAL CA: CPT

## 2017-08-02 PROCEDURE — G0463: CPT

## 2017-08-02 PROCEDURE — 36415 COLL VENOUS BLD VENIPUNCTURE: CPT

## 2017-08-02 PROCEDURE — 93005 ELECTROCARDIOGRAM TRACING: CPT

## 2017-08-16 ENCOUNTER — OUTPATIENT (OUTPATIENT)
Dept: OUTPATIENT SERVICES | Facility: HOSPITAL | Age: 57
LOS: 1 days | Discharge: HOME | End: 2017-08-16
Payer: COMMERCIAL

## 2017-08-16 ENCOUNTER — TRANSCRIPTION ENCOUNTER (OUTPATIENT)
Age: 57
End: 2017-08-16

## 2017-08-16 PROCEDURE — 19350 NIPPLE/AREOLA RECONSTRUCTION: CPT | Mod: 50

## 2017-08-16 PROCEDURE — 19380 REVJ RECONSTRUCTED BREAST: CPT | Mod: 50

## 2017-08-16 PROCEDURE — 20926: CPT

## 2018-02-17 ENCOUNTER — TRANSCRIPTION ENCOUNTER (OUTPATIENT)
Age: 58
End: 2018-02-17

## 2018-11-16 ENCOUNTER — TRANSCRIPTION ENCOUNTER (OUTPATIENT)
Age: 58
End: 2018-11-16

## 2019-05-30 PROBLEM — C50.919 MALIGNANT NEOPLASM OF UNSPECIFIED SITE OF UNSPECIFIED FEMALE BREAST: Chronic | Status: ACTIVE | Noted: 2017-06-27

## 2019-06-04 ENCOUNTER — OUTPATIENT (OUTPATIENT)
Dept: OUTPATIENT SERVICES | Facility: HOSPITAL | Age: 59
LOS: 1 days | End: 2019-06-04
Payer: MEDICAID

## 2019-06-04 VITALS
DIASTOLIC BLOOD PRESSURE: 87 MMHG | OXYGEN SATURATION: 98 % | TEMPERATURE: 98 F | HEART RATE: 68 BPM | WEIGHT: 169.32 LBS | SYSTOLIC BLOOD PRESSURE: 137 MMHG | HEIGHT: 62 IN | RESPIRATION RATE: 16 BRPM

## 2019-06-04 DIAGNOSIS — Z98.890 OTHER SPECIFIED POSTPROCEDURAL STATES: Chronic | ICD-10-CM

## 2019-06-04 DIAGNOSIS — Z90.89 ACQUIRED ABSENCE OF OTHER ORGANS: Chronic | ICD-10-CM

## 2019-06-04 DIAGNOSIS — N65.0 DEFORMITY OF RECONSTRUCTED BREAST: ICD-10-CM

## 2019-06-04 DIAGNOSIS — Z98.84 BARIATRIC SURGERY STATUS: Chronic | ICD-10-CM

## 2019-06-04 DIAGNOSIS — Z01.818 ENCOUNTER FOR OTHER PREPROCEDURAL EXAMINATION: ICD-10-CM

## 2019-06-04 DIAGNOSIS — Z90.13 ACQUIRED ABSENCE OF BILATERAL BREASTS AND NIPPLES: Chronic | ICD-10-CM

## 2019-06-04 DIAGNOSIS — Z90.13 ACQUIRED ABSENCE OF BILATERAL BREASTS AND NIPPLES: ICD-10-CM

## 2019-06-04 DIAGNOSIS — Z85.3 PERSONAL HISTORY OF MALIGNANT NEOPLASM OF BREAST: ICD-10-CM

## 2019-06-04 LAB
ANION GAP SERPL CALC-SCNC: 11 MMOL/L — SIGNIFICANT CHANGE UP (ref 5–17)
BUN SERPL-MCNC: 18 MG/DL — SIGNIFICANT CHANGE UP (ref 7–23)
CALCIUM SERPL-MCNC: 9.9 MG/DL — SIGNIFICANT CHANGE UP (ref 8.4–10.5)
CHLORIDE SERPL-SCNC: 104 MMOL/L — SIGNIFICANT CHANGE UP (ref 96–108)
CO2 SERPL-SCNC: 28 MMOL/L — SIGNIFICANT CHANGE UP (ref 22–31)
CREAT SERPL-MCNC: 0.74 MG/DL — SIGNIFICANT CHANGE UP (ref 0.5–1.3)
GLUCOSE SERPL-MCNC: 95 MG/DL — SIGNIFICANT CHANGE UP (ref 70–99)
HCT VFR BLD CALC: 45.2 % — HIGH (ref 34.5–45)
HGB BLD-MCNC: 14.5 G/DL — SIGNIFICANT CHANGE UP (ref 11.5–15.5)
MCHC RBC-ENTMCNC: 28.4 PG — SIGNIFICANT CHANGE UP (ref 27–34)
MCHC RBC-ENTMCNC: 32.1 GM/DL — SIGNIFICANT CHANGE UP (ref 32–36)
MCV RBC AUTO: 88.6 FL — SIGNIFICANT CHANGE UP (ref 80–100)
NRBC # BLD: 0 /100 WBCS — SIGNIFICANT CHANGE UP (ref 0–0)
PLATELET # BLD AUTO: 262 K/UL — SIGNIFICANT CHANGE UP (ref 150–400)
POTASSIUM SERPL-MCNC: 4.9 MMOL/L — SIGNIFICANT CHANGE UP (ref 3.5–5.3)
POTASSIUM SERPL-SCNC: 4.9 MMOL/L — SIGNIFICANT CHANGE UP (ref 3.5–5.3)
RBC # BLD: 5.1 M/UL — SIGNIFICANT CHANGE UP (ref 3.8–5.2)
RBC # FLD: 13 % — SIGNIFICANT CHANGE UP (ref 10.3–14.5)
SODIUM SERPL-SCNC: 143 MMOL/L — SIGNIFICANT CHANGE UP (ref 135–145)
WBC # BLD: 8.11 K/UL — SIGNIFICANT CHANGE UP (ref 3.8–10.5)
WBC # FLD AUTO: 8.11 K/UL — SIGNIFICANT CHANGE UP (ref 3.8–10.5)

## 2019-06-04 PROCEDURE — 93010 ELECTROCARDIOGRAM REPORT: CPT | Mod: NC

## 2019-06-04 PROCEDURE — 80048 BASIC METABOLIC PNL TOTAL CA: CPT

## 2019-06-04 PROCEDURE — 85027 COMPLETE CBC AUTOMATED: CPT

## 2019-06-04 PROCEDURE — 93005 ELECTROCARDIOGRAM TRACING: CPT

## 2019-06-04 PROCEDURE — G0463: CPT

## 2019-06-04 PROCEDURE — 36415 COLL VENOUS BLD VENIPUNCTURE: CPT

## 2019-06-04 RX ORDER — SODIUM CHLORIDE 9 MG/ML
1000 INJECTION, SOLUTION INTRAVENOUS
Refills: 0 | Status: DISCONTINUED | OUTPATIENT
Start: 2019-06-18 | End: 2019-06-18

## 2019-06-04 NOTE — H&P PST ADULT - NSANTHOSAYNRD_GEN_A_CORE
No. CASSIDY screening performed.  STOP BANG Legend: 0-2 = LOW Risk; 3-4 = INTERMEDIATE Risk; 5-8 = HIGH Risk

## 2019-06-04 NOTE — H&P PST ADULT - NSICDXPROBLEM_GEN_ALL_CORE_FT
PROBLEM DIAGNOSES  Problem: Breast reconstruction deformity  Assessment and Plan: Patient is schedule for bilateral breast reconstruction revision.

## 2019-06-04 NOTE — H&P PST ADULT - RS GEN PE MLT RESP DETAILS PC
respirations non-labored/good air movement/breath sounds equal/clear to auscultation bilaterally/normal/airway patent/no chest wall tenderness

## 2019-06-04 NOTE — H&P PST ADULT - HISTORY OF PRESENT ILLNESS
57 y/o female presents for PST. As per patient she was diagnosed with right breast cancer in 2014 and had bilateral mastectomy done with reconstruction. Patient is now returning for revision of bilateral breast reconstruction.

## 2019-06-04 NOTE — H&P PST ADULT - NSICDXPASTSURGICALHX_GEN_ALL_CORE_FT
PAST SURGICAL HISTORY:  H/O abdominoplasty 2005    H/O bilateral mastectomy 2014    H/O excision of dermoid cyst 2000    History of facelift 2018    S/P breast reconstruction, bilateral 2014 and 2018    S/P gastric bypass 2003    S/P tonsillectomy

## 2019-06-17 ENCOUNTER — TRANSCRIPTION ENCOUNTER (OUTPATIENT)
Age: 59
End: 2019-06-17

## 2019-06-18 ENCOUNTER — RESULT REVIEW (OUTPATIENT)
Age: 59
End: 2019-06-18

## 2019-06-18 ENCOUNTER — OUTPATIENT (OUTPATIENT)
Dept: OUTPATIENT SERVICES | Facility: HOSPITAL | Age: 59
LOS: 1 days | End: 2019-06-18
Payer: MEDICAID

## 2019-06-18 VITALS
OXYGEN SATURATION: 95 % | DIASTOLIC BLOOD PRESSURE: 81 MMHG | RESPIRATION RATE: 15 BRPM | TEMPERATURE: 98 F | SYSTOLIC BLOOD PRESSURE: 119 MMHG

## 2019-06-18 VITALS
OXYGEN SATURATION: 100 % | WEIGHT: 169.32 LBS | HEART RATE: 57 BPM | RESPIRATION RATE: 18 BRPM | DIASTOLIC BLOOD PRESSURE: 76 MMHG | TEMPERATURE: 98 F | HEIGHT: 62 IN | SYSTOLIC BLOOD PRESSURE: 124 MMHG

## 2019-06-18 DIAGNOSIS — Z98.890 OTHER SPECIFIED POSTPROCEDURAL STATES: Chronic | ICD-10-CM

## 2019-06-18 DIAGNOSIS — Z85.3 PERSONAL HISTORY OF MALIGNANT NEOPLASM OF BREAST: ICD-10-CM

## 2019-06-18 DIAGNOSIS — Z90.13 ACQUIRED ABSENCE OF BILATERAL BREASTS AND NIPPLES: Chronic | ICD-10-CM

## 2019-06-18 DIAGNOSIS — N65.0 DEFORMITY OF RECONSTRUCTED BREAST: ICD-10-CM

## 2019-06-18 DIAGNOSIS — Z98.84 BARIATRIC SURGERY STATUS: Chronic | ICD-10-CM

## 2019-06-18 DIAGNOSIS — Z90.3 ACQUIRED ABSENCE OF STOMACH [PART OF]: ICD-10-CM

## 2019-06-18 DIAGNOSIS — Z90.89 ACQUIRED ABSENCE OF OTHER ORGANS: Chronic | ICD-10-CM

## 2019-06-18 PROCEDURE — 19380 REVJ RECONSTRUCTED BREAST: CPT | Mod: 50

## 2019-06-18 PROCEDURE — 88304 TISSUE EXAM BY PATHOLOGIST: CPT

## 2019-06-18 PROCEDURE — 88304 TISSUE EXAM BY PATHOLOGIST: CPT | Mod: 26

## 2019-06-18 RX ORDER — ONDANSETRON 8 MG/1
4 TABLET, FILM COATED ORAL ONCE
Refills: 0 | Status: DISCONTINUED | OUTPATIENT
Start: 2019-06-18 | End: 2019-06-18

## 2019-06-18 RX ORDER — SODIUM CHLORIDE 9 MG/ML
1000 INJECTION, SOLUTION INTRAVENOUS
Refills: 0 | Status: DISCONTINUED | OUTPATIENT
Start: 2019-06-18 | End: 2019-06-18

## 2019-06-18 RX ORDER — ASCORBIC ACID 60 MG
1 TABLET,CHEWABLE ORAL
Qty: 0 | Refills: 0 | DISCHARGE

## 2019-06-18 RX ORDER — HYDROMORPHONE HYDROCHLORIDE 2 MG/ML
0.5 INJECTION INTRAMUSCULAR; INTRAVENOUS; SUBCUTANEOUS
Refills: 0 | Status: DISCONTINUED | OUTPATIENT
Start: 2019-06-18 | End: 2019-06-18

## 2019-06-18 RX ORDER — OXYCODONE HYDROCHLORIDE 5 MG/1
5 TABLET ORAL ONCE
Refills: 0 | Status: DISCONTINUED | OUTPATIENT
Start: 2019-06-18 | End: 2019-06-18

## 2019-06-18 RX ORDER — OMEGA-3 ACID ETHYL ESTERS 1 G
0 CAPSULE ORAL
Qty: 0 | Refills: 0 | DISCHARGE

## 2019-06-18 RX ADMIN — SODIUM CHLORIDE 50 MILLILITER(S): 9 INJECTION, SOLUTION INTRAVENOUS at 11:18

## 2019-06-18 RX ADMIN — HYDROMORPHONE HYDROCHLORIDE 0.5 MILLIGRAM(S): 2 INJECTION INTRAMUSCULAR; INTRAVENOUS; SUBCUTANEOUS at 15:45

## 2019-06-18 NOTE — ASU DISCHARGE PLAN (ADULT/PEDIATRIC) - CALL YOUR DOCTOR IF YOU HAVE ANY OF THE FOLLOWING:
Bleeding that does not stop/Swelling that gets worse/Nausea and vomiting that does not stop/Inability to tolerate liquids or foods

## 2019-06-18 NOTE — ASU DISCHARGE PLAN (ADULT/PEDIATRIC) - CARE PROVIDER_API CALL
Mango Lugo (MD)  Plastic Surgery; Surgery  [address], [address]  [place] [address]  Phone: [phone]  Fax: [phone]  Follow Up Time: 1 week

## 2019-06-18 NOTE — ASU PATIENT PROFILE, ADULT - PSH
H/O abdominoplasty  2005  H/O bilateral mastectomy  2014  H/O excision of dermoid cyst  2000  History of facelift  2018  S/P breast reconstruction, bilateral  2014 and 2018  S/P gastric bypass  2003  S/P tonsillectomy

## 2019-06-18 NOTE — ASU DISCHARGE PLAN (ADULT/PEDIATRIC) - NURSING INSTRUCTIONS
All discharge instructions reviewed with patient including pain, safety, medication and follow up care.  Pt acknowledges understanding

## 2019-06-21 LAB — SURGICAL PATHOLOGY STUDY: SIGNIFICANT CHANGE UP

## 2019-12-06 PROBLEM — D36.9 BENIGN NEOPLASM, UNSPECIFIED SITE: Chronic | Status: ACTIVE | Noted: 2019-06-04

## 2019-12-13 ENCOUNTER — APPOINTMENT (OUTPATIENT)
Dept: CARDIOLOGY | Facility: CLINIC | Age: 59
End: 2019-12-13
Payer: MEDICAID

## 2019-12-13 ENCOUNTER — NON-APPOINTMENT (OUTPATIENT)
Age: 59
End: 2019-12-13

## 2019-12-13 VITALS
RESPIRATION RATE: 14 BRPM | BODY MASS INDEX: 30.91 KG/M2 | HEART RATE: 63 BPM | SYSTOLIC BLOOD PRESSURE: 121 MMHG | WEIGHT: 168 LBS | DIASTOLIC BLOOD PRESSURE: 82 MMHG | OXYGEN SATURATION: 97 % | HEIGHT: 62 IN

## 2019-12-13 DIAGNOSIS — R06.09 OTHER FORMS OF DYSPNEA: ICD-10-CM

## 2019-12-13 DIAGNOSIS — L08.9 LOCAL INFECTION OF THE SKIN AND SUBCUTANEOUS TISSUE, UNSPECIFIED: ICD-10-CM

## 2019-12-13 DIAGNOSIS — Z86.018 PERSONAL HISTORY OF OTHER BENIGN NEOPLASM: ICD-10-CM

## 2019-12-13 DIAGNOSIS — Z82.49 FAMILY HISTORY OF ISCHEMIC HEART DISEASE AND OTHER DISEASES OF THE CIRCULATORY SYSTEM: ICD-10-CM

## 2019-12-13 DIAGNOSIS — Z85.3 PERSONAL HISTORY OF MALIGNANT NEOPLASM OF BREAST: ICD-10-CM

## 2019-12-13 DIAGNOSIS — Z82.5 FAMILY HISTORY OF ASTHMA AND OTHER CHRONIC LOWER RESPIRATORY DISEASES: ICD-10-CM

## 2019-12-13 DIAGNOSIS — Z78.9 OTHER SPECIFIED HEALTH STATUS: ICD-10-CM

## 2019-12-13 DIAGNOSIS — Z83.2 FAMILY HISTORY OF DISEASES OF THE BLOOD AND BLOOD-FORMING ORGANS AND CERTAIN DISORDERS INVOLVING THE IMMUNE MECHANISM: ICD-10-CM

## 2019-12-13 DIAGNOSIS — Z82.3 FAMILY HISTORY OF STROKE: ICD-10-CM

## 2019-12-13 DIAGNOSIS — Z80.3 FAMILY HISTORY OF MALIGNANT NEOPLASM OF BREAST: ICD-10-CM

## 2019-12-13 PROCEDURE — 93000 ELECTROCARDIOGRAM COMPLETE: CPT

## 2019-12-13 PROCEDURE — 99205 OFFICE O/P NEW HI 60 MIN: CPT

## 2019-12-13 RX ORDER — MULTIVIT-MIN/IRON/FOLIC ACID/K 18-600-40
CAPSULE ORAL
Refills: 0 | Status: ACTIVE | COMMUNITY

## 2019-12-13 RX ORDER — OMEGA-3/DHA/EPA/FISH OIL 300-1000MG
400 CAPSULE ORAL
Refills: 0 | Status: ACTIVE | COMMUNITY

## 2019-12-13 RX ORDER — CHLORHEXIDINE GLUCONATE 4 %
600 LIQUID (ML) TOPICAL
Refills: 0 | Status: ACTIVE | COMMUNITY

## 2019-12-13 RX ORDER — ASPIRIN 81 MG/1
81 TABLET, CHEWABLE ORAL
Refills: 0 | Status: ACTIVE | COMMUNITY

## 2019-12-13 RX ORDER — OMEGA-3/DHA/EPA/FISH OIL 300-1000MG
1000 CAPSULE ORAL
Refills: 0 | Status: ACTIVE | COMMUNITY

## 2019-12-13 NOTE — REVIEW OF SYSTEMS
[Feeling Fatigued] : feeling fatigued [Eyeglasses] : currently wearing eyeglasses [Skin: A Rash] : rash: [Tingling (Paresthesia)] : tingling [Negative] : Heme/Lymph

## 2019-12-13 NOTE — HISTORY OF PRESENT ILLNESS
[FreeTextEntry1] : Presents for evaluation as her sister recently had a heart attack and stent place. She reports some mild dyspnea on exertion, though not bothersome. She has no history of chest discomfort, but has had an event where she almost passed out from pain. She denies palpitations, lower extremity edema, orthopnea, or PND. She had blood work in February, which she reports mild hyperlipidemia. She has noted constant fatigue since her breast cancer treatment a few years ago.

## 2019-12-13 NOTE — PHYSICAL EXAM
[General Appearance - Well Developed] : well developed [General Appearance - Well Nourished] : well nourished [Normal Conjunctiva] : the conjunctiva exhibited no abnormalities [Normal Oral Mucosa] : normal oral mucosa [Normal Oropharynx] : normal oropharynx [Normal Jugular Venous V Waves Present] : normal jugular venous V waves present [5th Left ICS - MCL] : palpated at the 5th LICS in the midclavicular line [Normal] : normal [Rhythm Regular] : regular [Normal S1] : normal S1 [Normal S2] : normal S2 [No Murmur] : no murmurs heard [2+] : left 2+ [] : no respiratory distress [Abdomen Soft] : soft [Bowel Sounds] : normal bowel sounds [Abnormal Walk] : normal gait [Nail Clubbing] : no clubbing of the fingernails [Skin Color & Pigmentation] : normal skin color and pigmentation [Cyanosis, Localized] : no localized cyanosis [No Venous Stasis] : no venous stasis [Oriented To Time, Place, And Person] : oriented to person, place, and time [No Anxiety] : not feeling anxious [Right Carotid Bruit] : no bruit heard over the right carotid [Left Carotid Bruit] : no bruit heard over the left carotid

## 2019-12-13 NOTE — DISCUSSION/SUMMARY
[FreeTextEntry1] : RF for CAD and abnormal ECG. Further assessment for vascular disease and with hx. of breast cancer and abnormal electrocardiogram will order echocardiogram to assess for pericardial issues. F/u after testing.

## 2019-12-13 NOTE — REASON FOR VISIT
[Consultation] : a consultation regarding [Abnormal ECG] : an abnormal ECG [Hyperlipidemia] : hyperlipidemia [FreeTextEntry1] : Abdifatah Hx. of CAD

## 2019-12-17 LAB
25(OH)D3 SERPL-MCNC: 28.1 NG/ML
ALBUMIN SERPL ELPH-MCNC: 4.4 G/DL
ALP BLD-CCNC: 105 U/L
ALT SERPL-CCNC: 25 U/L
ANION GAP SERPL CALC-SCNC: 21 MMOL/L
AST SERPL-CCNC: 34 U/L
BASOPHILS # BLD AUTO: 0.05 K/UL
BASOPHILS NFR BLD AUTO: 0.5 %
BILIRUB SERPL-MCNC: 0.2 MG/DL
BUN SERPL-MCNC: 17 MG/DL
CALCIUM SERPL-MCNC: 9.2 MG/DL
CHLORIDE SERPL-SCNC: 102 MMOL/L
CHOLEST SERPL-MCNC: 208 MG/DL
CHOLEST/HDLC SERPL: 4.6 RATIO
CK SERPL-CCNC: 87 U/L
CO2 SERPL-SCNC: 14 MMOL/L
CREAT SERPL-MCNC: 0.98 MG/DL
EOSINOPHIL # BLD AUTO: 0.23 K/UL
EOSINOPHIL NFR BLD AUTO: 2.1 %
ESTIMATED AVERAGE GLUCOSE: 111 MG/DL
GLUCOSE SERPL-MCNC: 114 MG/DL
HBA1C MFR BLD HPLC: 5.5 %
HCT VFR BLD CALC: 42.7 %
HDLC SERPL-MCNC: 45 MG/DL
HGB BLD-MCNC: 13.8 G/DL
IMM GRANULOCYTES NFR BLD AUTO: 0.4 %
LDLC SERPL CALC-MCNC: 94 MG/DL
LDLC SERPL DIRECT ASSAY-MCNC: 127 MG/DL
LYMPHOCYTES # BLD AUTO: 2.34 K/UL
LYMPHOCYTES NFR BLD AUTO: 21.6 %
MAN DIFF?: NORMAL
MCHC RBC-ENTMCNC: 28.8 PG
MCHC RBC-ENTMCNC: 32.3 GM/DL
MCV RBC AUTO: 89 FL
MONOCYTES # BLD AUTO: 0.77 K/UL
MONOCYTES NFR BLD AUTO: 7.1 %
NEUTROPHILS # BLD AUTO: 7.39 K/UL
NEUTROPHILS NFR BLD AUTO: 68.3 %
PLATELET # BLD AUTO: 280 K/UL
POTASSIUM SERPL-SCNC: 5.1 MMOL/L
PROT SERPL-MCNC: 7 G/DL
RBC # BLD: 4.8 M/UL
RBC # FLD: 13.2 %
SODIUM SERPL-SCNC: 137 MMOL/L
TRIGL SERPL-MCNC: 344 MG/DL
TSH SERPL-ACNC: 2.15 UIU/ML
WBC # FLD AUTO: 10.82 K/UL

## 2019-12-18 ENCOUNTER — APPOINTMENT (OUTPATIENT)
Dept: CARDIOLOGY | Facility: CLINIC | Age: 59
End: 2019-12-18
Payer: MEDICAID

## 2019-12-18 PROCEDURE — 93015 CV STRESS TEST SUPVJ I&R: CPT

## 2019-12-18 PROCEDURE — 93306 TTE W/DOPPLER COMPLETE: CPT

## 2019-12-19 ENCOUNTER — APPOINTMENT (OUTPATIENT)
Dept: CARDIOLOGY | Facility: CLINIC | Age: 59
End: 2019-12-19
Payer: MEDICAID

## 2019-12-19 PROCEDURE — 93880 EXTRACRANIAL BILAT STUDY: CPT

## 2019-12-20 ENCOUNTER — APPOINTMENT (OUTPATIENT)
Dept: CARDIOLOGY | Facility: CLINIC | Age: 59
End: 2019-12-20
Payer: MEDICAID

## 2019-12-20 VITALS
WEIGHT: 170 LBS | DIASTOLIC BLOOD PRESSURE: 73 MMHG | HEART RATE: 76 BPM | SYSTOLIC BLOOD PRESSURE: 117 MMHG | BODY MASS INDEX: 31.28 KG/M2 | OXYGEN SATURATION: 98 % | HEIGHT: 62 IN

## 2019-12-20 DIAGNOSIS — E78.5 HYPERLIPIDEMIA, UNSPECIFIED: ICD-10-CM

## 2019-12-20 DIAGNOSIS — Z87.891 PERSONAL HISTORY OF NICOTINE DEPENDENCE: ICD-10-CM

## 2019-12-20 DIAGNOSIS — R94.31 ABNORMAL ELECTROCARDIOGRAM [ECG] [EKG]: ICD-10-CM

## 2019-12-20 DIAGNOSIS — E78.2 MIXED HYPERLIPIDEMIA: ICD-10-CM

## 2019-12-20 PROCEDURE — 99214 OFFICE O/P EST MOD 30 MIN: CPT

## 2019-12-20 RX ORDER — ALPRAZOLAM 1 MG/1
1 TABLET ORAL
Qty: 90 | Refills: 0 | Status: COMPLETED | COMMUNITY
Start: 2019-12-11

## 2019-12-20 RX ORDER — AZITHROMYCIN 250 MG/1
250 TABLET, FILM COATED ORAL
Qty: 31 | Refills: 0 | Status: COMPLETED | COMMUNITY
Start: 2019-10-31

## 2019-12-20 RX ORDER — SULFAMETHOXAZOLE AND TRIMETHOPRIM 800; 160 MG/1; MG/1
800-160 TABLET ORAL
Qty: 14 | Refills: 0 | Status: COMPLETED | COMMUNITY
Start: 2019-11-25

## 2020-01-10 PROBLEM — Z87.891 FORMER SMOKER: Status: ACTIVE | Noted: 2019-12-13

## 2020-01-10 PROBLEM — E78.5 MILD HYPERLIPIDEMIA: Noted: 2019-12-13

## 2020-01-10 PROBLEM — E78.2 HYPERLIPIDEMIA, MIXED: Status: ACTIVE | Noted: 2020-01-10

## 2020-01-10 PROBLEM — R94.31 ABNORMAL EKG: Status: ACTIVE | Noted: 2019-12-13

## 2020-01-10 NOTE — PHYSICAL EXAM
[General Appearance - Well Developed] : well developed [Normal Conjunctiva] : the conjunctiva exhibited no abnormalities [General Appearance - Well Nourished] : well nourished [Normal Oropharynx] : normal oropharynx [Normal Oral Mucosa] : normal oral mucosa [] : no respiratory distress [Normal Jugular Venous V Waves Present] : normal jugular venous V waves present [Abdomen Soft] : soft [Abnormal Walk] : normal gait [Bowel Sounds] : normal bowel sounds [Cyanosis, Localized] : no localized cyanosis [Skin Color & Pigmentation] : normal skin color and pigmentation [Nail Clubbing] : no clubbing of the fingernails [Oriented To Time, Place, And Person] : oriented to person, place, and time [No Venous Stasis] : no venous stasis [No Anxiety] : not feeling anxious [5th Left ICS - MCL] : palpated at the 5th LICS in the midclavicular line [Rhythm Regular] : regular [Normal S1] : normal S1 [Normal] : normal [Normal S2] : normal S2 [No Murmur] : no murmurs heard [Right Carotid Bruit] : no bruit heard over the right carotid [Left Carotid Bruit] : no bruit heard over the left carotid [2+] : left 2+

## 2020-01-10 NOTE — REASON FOR VISIT
[Consultation] : a consultation regarding [Abnormal ECG] : an abnormal ECG [FreeTextEntry1] : Abdifatah Hx. of CAD [Hyperlipidemia] : hyperlipidemia

## 2020-01-10 NOTE — DISCUSSION/SUMMARY
[FreeTextEntry1] : HL: Discussed diet and exercise and will have reassessment in 3-6 months to decide about therapy.\par Low Vit D. Discussed Vitamin D3 supplement.\par Carotid disease: Discussed the importance of lipid control to prevent progression. \par F/u in 3-4 months.

## 2020-06-18 NOTE — HISTORY OF PRESENT ILLNESS
fever
[FreeTextEntry1] : Reviewed testing results and lipids. No new complaints.  Cholesterol high. Discussed diet and exercise in regards to this.

## 2024-05-09 NOTE — H&P PST ADULT - NEUROLOGICAL DETAILS
Patient here for PT/INR today, tolerated procedure well.       
Result given by MA/RN.
no spontaneous movement/sensation intact/alert and oriented x 3/normal strength